# Patient Record
Sex: MALE | Race: WHITE | NOT HISPANIC OR LATINO
[De-identification: names, ages, dates, MRNs, and addresses within clinical notes are randomized per-mention and may not be internally consistent; named-entity substitution may affect disease eponyms.]

---

## 2022-02-11 ENCOUNTER — APPOINTMENT (OUTPATIENT)
Dept: DISASTER EMERGENCY | Facility: CLINIC | Age: 62
End: 2022-02-11

## 2022-03-04 ENCOUNTER — APPOINTMENT (OUTPATIENT)
Dept: DISASTER EMERGENCY | Facility: CLINIC | Age: 62
End: 2022-03-04

## 2022-04-14 PROBLEM — Z00.00 ENCOUNTER FOR PREVENTIVE HEALTH EXAMINATION: Status: ACTIVE | Noted: 2022-04-14

## 2022-09-09 ENCOUNTER — APPOINTMENT (OUTPATIENT)
Dept: DISASTER EMERGENCY | Facility: CLINIC | Age: 62
End: 2022-09-09

## 2023-05-26 ENCOUNTER — APPOINTMENT (OUTPATIENT)
Dept: OTOLARYNGOLOGY | Facility: CLINIC | Age: 63
End: 2023-05-26
Payer: COMMERCIAL

## 2023-05-26 VITALS
TEMPERATURE: 97.3 F | SYSTOLIC BLOOD PRESSURE: 132 MMHG | BODY MASS INDEX: 23.49 KG/M2 | WEIGHT: 155 LBS | HEIGHT: 68 IN | HEART RATE: 66 BPM | DIASTOLIC BLOOD PRESSURE: 85 MMHG

## 2023-05-26 DIAGNOSIS — Z78.9 OTHER SPECIFIED HEALTH STATUS: ICD-10-CM

## 2023-05-26 DIAGNOSIS — L25.3 UNSPECIFIED CONTACT DERMATITIS DUE TO OTHER CHEMICAL PRODUCTS: ICD-10-CM

## 2023-05-26 DIAGNOSIS — Z80.9 FAMILY HISTORY OF MALIGNANT NEOPLASM, UNSPECIFIED: ICD-10-CM

## 2023-05-26 DIAGNOSIS — Z82.49 FAMILY HISTORY OF ISCHEMIC HEART DISEASE AND OTHER DISEASES OF THE CIRCULATORY SYSTEM: ICD-10-CM

## 2023-05-26 DIAGNOSIS — Z86.018 PERSONAL HISTORY OF OTHER BENIGN NEOPLASM: ICD-10-CM

## 2023-05-26 DIAGNOSIS — Z87.2 PERSONAL HISTORY OF DISEASES OF THE SKIN AND SUBCUTANEOUS TISSUE: ICD-10-CM

## 2023-05-26 DIAGNOSIS — D80.9 IMMUNODEFICIENCY WITH PREDOMINANTLY ANTIBODY DEFECTS, UNSPECIFIED: ICD-10-CM

## 2023-05-26 DIAGNOSIS — J34.89 OTHER SPECIFIED DISORDERS OF NOSE AND NASAL SINUSES: ICD-10-CM

## 2023-05-26 DIAGNOSIS — Z83.3 FAMILY HISTORY OF DIABETES MELLITUS: ICD-10-CM

## 2023-05-26 PROCEDURE — 99204 OFFICE O/P NEW MOD 45 MIN: CPT | Mod: 25

## 2023-05-26 PROCEDURE — 31575 DIAGNOSTIC LARYNGOSCOPY: CPT

## 2023-05-28 ENCOUNTER — TRANSCRIPTION ENCOUNTER (OUTPATIENT)
Age: 63
End: 2023-05-28

## 2023-05-29 PROBLEM — Z78.9 DOES NOT USE ILLICIT DRUGS: Status: ACTIVE | Noted: 2023-05-26

## 2023-05-29 PROBLEM — D80.9 IMMUNOGLOBULIN DEFICIENCY: Status: RESOLVED | Noted: 2023-05-29 | Resolved: 2023-05-29

## 2023-05-29 PROBLEM — J34.89 NASAL SEPTAL PERFORATION: Status: ACTIVE | Noted: 2023-05-26

## 2023-05-29 PROBLEM — Z83.3 FAMILY HISTORY OF DIABETES MELLITUS: Status: ACTIVE | Noted: 2023-05-26

## 2023-05-29 PROBLEM — L25.3 LATEX ALLERGY, CONTACT DERMATITIS: Status: RESOLVED | Noted: 2023-05-29 | Resolved: 2023-05-29

## 2023-05-29 PROBLEM — Z87.2 HISTORY OF ROSACEA: Status: RESOLVED | Noted: 2023-05-29 | Resolved: 2023-05-29

## 2023-05-29 PROBLEM — Z80.9 FAMILY HISTORY OF CANCER: Status: ACTIVE | Noted: 2023-05-29

## 2023-05-29 PROBLEM — Z82.49 FAMILY HISTORY OF HYPERTENSION: Status: ACTIVE | Noted: 2023-05-26

## 2023-05-29 RX ORDER — GLUCOSAMINE HCL/CHONDROITIN SU 500-400 MG
3 CAPSULE ORAL
Refills: 0 | Status: ACTIVE | COMMUNITY

## 2023-05-29 RX ORDER — GLUC/MSM/COLGN2/HYAL/ANTIARTH3 375-375-20
TABLET ORAL
Refills: 0 | Status: ACTIVE | COMMUNITY

## 2023-05-29 RX ORDER — IMMUNE GLOBULIN,GAMMA(IGG) 5 G
0 VIAL (EA) INTRAVENOUS
Refills: 0 | Status: ACTIVE | COMMUNITY

## 2023-05-29 RX ORDER — ROSUVASTATIN CALCIUM 5 MG/1
5 TABLET, FILM COATED ORAL
Refills: 0 | Status: ACTIVE | COMMUNITY

## 2023-05-29 RX ORDER — LAMOTRIGINE 25 MG/1
TABLET ORAL TWICE DAILY
Refills: 0 | Status: ACTIVE | COMMUNITY

## 2023-05-29 RX ORDER — METFORMIN HYDROCHLORIDE 500 MG/1
500 TABLET, COATED ORAL
Qty: 180 | Refills: 3 | Status: ACTIVE | COMMUNITY

## 2023-05-29 RX ORDER — CYANOCOBALAMIN (VITAMIN B-12) 2500 MCG
2500 TABLET, SUBLINGUAL SUBLINGUAL WEEKLY
Refills: 0 | Status: ACTIVE | COMMUNITY

## 2023-05-29 RX ORDER — TRAZODONE HYDROCHLORIDE 300 MG/1
TABLET ORAL
Refills: 0 | Status: ACTIVE | COMMUNITY

## 2023-05-29 RX ORDER — DOXYCYCLINE 40 MG/1
40 CAPSULE ORAL DAILY
Refills: 0 | Status: ACTIVE | COMMUNITY

## 2023-05-29 RX ORDER — BUPROPION HYDROCHLORIDE 300 MG/1
300 TABLET, EXTENDED RELEASE ORAL
Refills: 0 | Status: ACTIVE | COMMUNITY

## 2023-05-29 NOTE — PROCEDURE
[de-identified] : \par Indication:  throat clearing.\par -Verbal consent was obtained from patient prior to procedure.\par Flexible laryngoscopy was performed via mouth and revealed the following:\par   -- Left oropharyngeal mass resembles a tonsil, does not extend much inferiorly\par   -- Base of tongue was symmetric and not enlarged.\par   -- Vallecula was clear\par   -- Epiglottis, both aryepiglottic folds and both false vocal folds were normal\par   -- Arytenoids both with mild edema and no erythema \par   -- True vocal folds were fully mobile and without lesions. \par   -- Post cricoid area was clear.\par   -- Interarytenoid edema was absent     \par   \par The patient tolerated the procedure well.\par

## 2023-05-29 NOTE — CONSULT LETTER
[Dear  ___] : Dear  [unfilled], [( Thank you for referring [unfilled] for consultation for _____ )] : Thank you for referring [unfilled] for consultation for [unfilled] [Please see my note below.] : Please see my note below. [Consult Closing:] : Thank you very much for allowing me to participate in the care of this patient.  If you have any questions, please do not hesitate to contact me. [Sincerely,] : Sincerely, [DrAddie  ___] : Dr. EMERSON [FreeTextEntry2] : Glenn Good MD\par [address] [facility], [address]\par [place] [id number]  [FreeTextEntry3] : \par Sarah Ferrer MD \par Otolaryngology, Head and Neck Surgery \par \par

## 2023-05-29 NOTE — PHYSICAL EXAM
[] : septum deviated to the left [Midline] : trachea located in midline position [Removed] : palatine tonsils previously removed [Laryngoscopy Performed] : laryngoscopy was performed, see procedure section for findings [Normal] : no rashes [FreeTextEntry1] : No hoarseness or muffled voice. [de-identified] : Mobile nontender LNs in level 5 bilateral 1-1.5 cm; level 2 (left 2 cm, right 1 cm) [de-identified] : Posterior low septal perforation. [de-identified] : 2.5 cm ovoid, reddish mass behind left tonsillar pillar, with mucus studding resembling crypts; protrudes more medial when he opens mouth and extends tongue. [de-identified] : S/p UPPP [de-identified] : Carotid pulses 2+ bilateral.  [de-identified] : See NECK

## 2023-05-29 NOTE — REVIEW OF SYSTEMS
[Patient Intake Form Reviewed] : Patient intake form was reviewed [Hearing Loss] : hearing loss [Ear Noises] : ear noises [Feeling Tired] : feeling tired [Negative] : Heme/Lymph [As Noted in HPI] : as noted in HPI [de-identified] : jon

## 2023-05-29 NOTE — ASSESSMENT
[FreeTextEntry1] : Mr. ORTIZ is a 62 year old man who has a new left peritonsillar mass that is not painful, but he has persistent globus sensation and chronic throat clearing for some time.  He is s/p tonsillectomy as a child and s/p UPPP as an adult. Never a smoker; no alcohol use.\par I see a 2.5 cm ovoid, slightly erythema mass protruding from behind the left tonsil pillar.  The mass is studded with what appears to be mucus in crypts, suggesting cryptic tonsillitis.\par He has bilateral level 2 and level 5 soft cervical lymphadenopathy that he attributes to his CLL.  The left level 2 is the largest on palpation, 2 cm.\par Overall, I favor an inflammatory process, but neoplasm must be ruled out.\par \par He has a low posterior nasal septal perforation that is asymptomatic and likely related to prior septoplasty.\par PLAN\par - CT neck + contrast at [facility]\par - amoxicillin\par - chlorhexidine gargles\par \par Return in 2 weeks.  He may need tonsil biopsy or revision tonsillectomy.\par

## 2023-05-29 NOTE — HISTORY OF PRESENT ILLNESS
[de-identified] : Mr. ORTIZ is a 62 year old man who was referred by Dr. Good for left peritonsillar mass.\par PMH: CLL, Hx acoustic neuroma, pituitary adenoma, depression\par \par He has many years of globus sensation in his throat, no pain or difficulty swallowing.  Constant throat clearing.\par Noted on recent exam by Dr. Good to have a left peritonsillar mass.\par No hx of tobacco use.  No alcohol use.\par He is s/p tonsillectomy as a child, with UPPP as an adult.\par Yesterday, had CT chest/abdoment/pelvis to follow his CLL.  Has lymph nodes in his neck due to CLL.\par

## 2023-06-07 ENCOUNTER — APPOINTMENT (OUTPATIENT)
Dept: OTOLARYNGOLOGY | Facility: CLINIC | Age: 63
End: 2023-06-07
Payer: COMMERCIAL

## 2023-06-07 VITALS
DIASTOLIC BLOOD PRESSURE: 89 MMHG | SYSTOLIC BLOOD PRESSURE: 134 MMHG | TEMPERATURE: 96.7 F | HEIGHT: 68 IN | BODY MASS INDEX: 23.49 KG/M2 | HEART RATE: 76 BPM | WEIGHT: 155 LBS

## 2023-06-07 PROCEDURE — 99214 OFFICE O/P EST MOD 30 MIN: CPT

## 2023-06-12 VITALS
HEART RATE: 62 BPM | WEIGHT: 154.32 LBS | SYSTOLIC BLOOD PRESSURE: 133 MMHG | HEIGHT: 67 IN | OXYGEN SATURATION: 99 % | RESPIRATION RATE: 16 BRPM | DIASTOLIC BLOOD PRESSURE: 75 MMHG | TEMPERATURE: 98 F

## 2023-06-12 NOTE — ASU PATIENT PROFILE, ADULT - NSICDXPASTMEDICALHX_GEN_ALL_CORE_FT
PAST MEDICAL HISTORY:  Acoustic neuroma     Anxiety and depression     CLL (chronic lymphocytic leukemia)     DM (diabetes mellitus)     GERD (gastroesophageal reflux disease)     HTN (hypertension)     OCTAVIO (obstructive sleep apnea)      PAST MEDICAL HISTORY:  Acoustic neuroma     Anxiety and depression     CLL (chronic lymphocytic leukemia)     DM (diabetes mellitus) controled    GERD (gastroesophageal reflux disease)     HTN (hypertension)     OCTAVIO (obstructive sleep apnea)     Pituitary adenoma

## 2023-06-12 NOTE — ASU PATIENT PROFILE, ADULT - NSICDXPASTSURGICALHX_GEN_ALL_CORE_FT
PAST SURGICAL HISTORY:  H/O sinus surgery     History of cholecystectomy     History of surgery maxillofacial reconstruction     PAST SURGICAL HISTORY:  H/O gastric bypass     H/O hernia repair right, 2022    H/O sinus surgery     History of surgery maxillofacial reconstruction    History of surgery genioglossal advansemnt    S/P UPPP (uvulopalatopharyngoplasty)

## 2023-06-13 ENCOUNTER — RESULT REVIEW (OUTPATIENT)
Age: 63
End: 2023-06-13

## 2023-06-13 ENCOUNTER — OUTPATIENT (OUTPATIENT)
Dept: OUTPATIENT SERVICES | Facility: HOSPITAL | Age: 63
LOS: 1 days | Discharge: HOME | End: 2023-06-13
Payer: COMMERCIAL

## 2023-06-13 ENCOUNTER — TRANSCRIPTION ENCOUNTER (OUTPATIENT)
Age: 63
End: 2023-06-13

## 2023-06-13 ENCOUNTER — APPOINTMENT (OUTPATIENT)
Dept: OTOLARYNGOLOGY | Facility: HOSPITAL | Age: 63
End: 2023-06-13

## 2023-06-13 VITALS
RESPIRATION RATE: 18 BRPM | OXYGEN SATURATION: 98 % | TEMPERATURE: 97 F | SYSTOLIC BLOOD PRESSURE: 108 MMHG | DIASTOLIC BLOOD PRESSURE: 62 MMHG | HEART RATE: 57 BPM

## 2023-06-13 DIAGNOSIS — Z98.890 OTHER SPECIFIED POSTPROCEDURAL STATES: Chronic | ICD-10-CM

## 2023-06-13 DIAGNOSIS — Z98.84 BARIATRIC SURGERY STATUS: Chronic | ICD-10-CM

## 2023-06-13 PROCEDURE — 88331 PATH CONSLTJ SURG 1 BLK 1SPC: CPT

## 2023-06-13 PROCEDURE — 88331 PATH CONSLTJ SURG 1 BLK 1SPC: CPT | Mod: 26

## 2023-06-13 PROCEDURE — 88305 TISSUE EXAM BY PATHOLOGIST: CPT

## 2023-06-13 PROCEDURE — 88360 TUMOR IMMUNOHISTOCHEM/MANUAL: CPT | Mod: 26,59

## 2023-06-13 PROCEDURE — 88341 IMHCHEM/IMCYTCHM EA ADD ANTB: CPT | Mod: 26,59

## 2023-06-13 PROCEDURE — 31536 LARYNGOSCOPY W/BX & OP SCOPE: CPT

## 2023-06-13 PROCEDURE — 88360 TUMOR IMMUNOHISTOCHEM/MANUAL: CPT

## 2023-06-13 PROCEDURE — 31536 LARYNGOSCOPY W/BX & OP SCOPE: CPT | Mod: GC

## 2023-06-13 PROCEDURE — 88341 IMHCHEM/IMCYTCHM EA ADD ANTB: CPT

## 2023-06-13 PROCEDURE — 88342 IMHCHEM/IMCYTCHM 1ST ANTB: CPT | Mod: 26,59

## 2023-06-13 PROCEDURE — C9399: CPT

## 2023-06-13 PROCEDURE — 88189 FLOWCYTOMETRY/READ 16 & >: CPT

## 2023-06-13 PROCEDURE — 88305 TISSUE EXAM BY PATHOLOGIST: CPT | Mod: 26

## 2023-06-13 RX ORDER — METFORMIN HYDROCHLORIDE 850 MG/1
1 TABLET ORAL
Refills: 0 | DISCHARGE

## 2023-06-13 RX ORDER — LANOLIN ALCOHOL/MO/W.PET/CERES
1 CREAM (GRAM) TOPICAL
Refills: 0 | DISCHARGE

## 2023-06-13 RX ORDER — LAMOTRIGINE 25 MG/1
1 TABLET, ORALLY DISINTEGRATING ORAL
Refills: 0 | DISCHARGE

## 2023-06-13 RX ORDER — ROSUVASTATIN CALCIUM 5 MG/1
1 TABLET ORAL
Refills: 0 | DISCHARGE

## 2023-06-13 RX ORDER — AMOXICILLIN AND CLAVULANATE POTASSIUM 500; 125 MG/1; MG/1
500-125 TABLET, FILM COATED ORAL
Qty: 14 | Refills: 0 | Status: COMPLETED | COMMUNITY
Start: 2023-05-26 | End: 2023-06-02

## 2023-06-13 RX ORDER — OXYCODONE AND ACETAMINOPHEN 5; 325 MG/1; MG/1
1 TABLET ORAL EVERY 4 HOURS
Refills: 0 | Status: DISCONTINUED | OUTPATIENT
Start: 2023-06-13 | End: 2023-06-13

## 2023-06-13 RX ORDER — DIPHENHYDRAMINE HYDROCHLORIDE AND LIDOCAINE HYDROCHLORIDE AND ALUMINUM HYDROXIDE AND MAGNESIUM HYDRO
5 KIT
Qty: 1 | Refills: 1
Start: 2023-06-13 | End: 2023-06-26

## 2023-06-13 RX ORDER — MORPHINE SULFATE 50 MG/1
4 CAPSULE, EXTENDED RELEASE ORAL
Refills: 0 | Status: DISCONTINUED | OUTPATIENT
Start: 2023-06-13 | End: 2023-06-13

## 2023-06-13 RX ORDER — IMMUNE GLOBULIN,GAMMA(IGG) 5 %
0 VIAL (ML) INTRAVENOUS
Refills: 0 | DISCHARGE

## 2023-06-13 RX ORDER — CHLORHEXIDINE GLUCONATE 213 G/1000ML
15 SOLUTION TOPICAL
Qty: 1 | Refills: 0
Start: 2023-06-13 | End: 2023-06-19

## 2023-06-13 RX ORDER — ACETAMINOPHEN 500 MG
650 TABLET ORAL EVERY 6 HOURS
Refills: 0 | Status: DISCONTINUED | OUTPATIENT
Start: 2023-06-13 | End: 2023-06-13

## 2023-06-13 RX ORDER — SODIUM CHLORIDE 9 MG/ML
1000 INJECTION, SOLUTION INTRAVENOUS
Refills: 0 | Status: DISCONTINUED | OUTPATIENT
Start: 2023-06-13 | End: 2023-06-13

## 2023-06-13 RX ORDER — BENZOCAINE 10 %
1 GEL (GRAM) MUCOUS MEMBRANE
Qty: 2 | Refills: 0
Start: 2023-06-13 | End: 2023-06-19

## 2023-06-13 RX ORDER — BUPROPION HYDROCHLORIDE 150 MG/1
1 TABLET, EXTENDED RELEASE ORAL
Refills: 0 | DISCHARGE

## 2023-06-13 RX ORDER — ONDANSETRON 8 MG/1
4 TABLET, FILM COATED ORAL EVERY 6 HOURS
Refills: 0 | Status: DISCONTINUED | OUTPATIENT
Start: 2023-06-13 | End: 2023-06-13

## 2023-06-13 NOTE — BRIEF OPERATIVE NOTE - OPERATION/FINDINGS
left oropharyngeal wall with lymph-appearing tissue extending into nasopharynx; frozen showing tonsil tissue, cannot rule out lymphoproliferative disorder Left oropharyngeal wall with lymph-appearing tissue 2.5 cm extending into nasopharynx; frozen section showed tonsil tissue, cannot rule out lymphoproliferative disorder.

## 2023-06-13 NOTE — PHYSICAL EXAM
[FreeTextEntry1] : No hoarseness or muffled voice. [de-identified] : Multiple enlarged, mobile, nontender LNs in bilateral level 2, 3, 4 and 5. [Normal] : gums are normal [Midline] : trachea located in midline position [Removed] : palatine tonsils previously removed [de-identified] : 3 cm ovoid, exophytic mass on left posterior pharyngeal wall and looks like it has crypts with debris. [de-identified] : S/p UPPP [de-identified] : See NECK

## 2023-06-13 NOTE — ASU DISCHARGE PLAN (ADULT/PEDIATRIC) - CARE PROVIDER_API CALL
Sarah Ferrer  Otolaryngology  [address], [address]  [place] [address]  Phone: [phone]  Fax: [phone]  Established Patient  Follow Up Time: 1 week

## 2023-06-13 NOTE — PRE-ANESTHESIA EVALUATION ADULT - NSANTHOSAYNRD_GEN_A_CORE
No. OCTAVIO screening performed.  STOP BANG Legend: 0-2 = LOW Risk; 3-4 = INTERMEDIATE Risk; 5-8 = HIGH Risk

## 2023-06-13 NOTE — ASSESSMENT
[FreeTextEntry1] : Mr. ORITZ has left posterior pharyngeal wall mass that has not changed after a course of oral antibiotics and that is visible on CT neck, along with his enlarging bilateral cervical lymphadenopathy.\par \par I recommended microlaryngoscopy and biopsy of the oropharyngeal mass to r/o malignancy (CLL or a squamous cell carcinoma) vs inflammation.  He understands that I do not plan to excise the entire mass at time of biopsy.\par I have explained the risks of laryngoscopy and biopsy including, but not limited to, general anesthesia, bleeding, infection, persistent symptoms, injury to the teeth/lips/gums, failure to extubate, and the possible need for further procedures.\par His questions were answered, and he agreed to proceed.\par The biopsy has been scheduled for June 13, 2023, at [facility].\par Medical clearance from Dr. Keenan would be greatly appreciated.

## 2023-06-13 NOTE — CONSULT LETTER
[Dear  ___] : Dear  [unfilled], [Please see my note below.] : Please see my note below. [Consult Closing:] : Thank you very much for allowing me to participate in the care of this patient.  If you have any questions, please do not hesitate to contact me. [Sincerely,] : Sincerely, [Courtesy Letter:] : I had the pleasure of seeing your patient, [unfilled], in my office today. [FreeTextEntry2] : Cristobal Keenan MD\par [address]\[place], [address] [FreeTextEntry3] : \par Sarah Ferrer MD \par Otolaryngology, Head and Neck Surgery \par \par  [DrAddie ___] : Dr. EMERSON

## 2023-06-13 NOTE — ASU DISCHARGE PLAN (ADULT/PEDIATRIC) - NS MD DC FALL RISK RISK
For information on Fall & Injury Prevention, visit: [email or web address] OR  [email or web address] OR  https://www.cdc.gov/steadi/patient.html

## 2023-06-13 NOTE — ASU DISCHARGE PLAN (ADULT/PEDIATRIC) - ASU DC SPECIAL INSTRUCTIONSFT
take tylenol or motrin as needed for pain  use lozenges and mouthwash as prescribed as needed for pain

## 2023-06-13 NOTE — PRE-ANESTHESIA EVALUATION ADULT - NSANTHDISPORD_GEN_ALL_CORE
4 Eyes Skin Assessment     The patient is being assess for  Admission    I agree that 2 RN's have performed a thorough Head to Toe Skin Assessment on the patient. ALL assessment sites listed below have been assessed. Areas assessed by both nurses: Clifford/Candace  [x]   Head, Face, and Ears   [x]   Shoulders, Back, and Chest  [x]   Arms, Elbows, and Hands   [x]   Coccyx, Sacrum, and IschIum  [x]   Legs, Feet, and Heels        Does the Patient have Skin Breakdown?   No         Pravin Prevention initiated:  No   Wound Care Orders initiated:  No      [facility] nurse consulted for Pressure Injury (Stage 3,4, Unstageable, DTI, NWPT, and Complex wounds), New and Established Ostomies:  No      Nurse 1 eSignature: Electronically signed by Fareed Beauchamp RN on 9/10/19 at 1:43 PM    **SHARE this note so that the co-signing nurse is able to place an eSignature**    Nurse 2 eSignature: Electronically signed by Carmen Canales RN on 9/10/19 at 2:13 PM
PACU

## 2023-06-13 NOTE — HISTORY OF PRESENT ILLNESS
[de-identified] : Mr. ORTIZ reports no change in globus sensation or throat clearing after antibiotics.  No throat pain.\par He had CT neck.\par \par INITIAL VISIT 5/26/2023\par Mr. ORTIZ is a 62 year old man who was referred by Dr. Good for left peritonsillar mass.\par PMH: CLL, Hx acoustic neuroma, pituitary adenoma, depression\par He has many years of globus sensation in his throat, no pain or difficulty swallowing.  Constant throat clearing.\par Noted on recent exam by Dr. Good to have a left peritonsillar mass.\par No hx of tobacco use.  No alcohol use.\par He is s/p tonsillectomy as a child, with UPPP as an adult.\par Yesterday, had CT chest/abdoment/pelvis to follow his CLL.  Has lymph nodes in his neck due to CLL.\par  \par \par CT NECK with intravenous contrast (6/2/2023) at [facility]:\par - COMPARISON: CT scan 5/13/2017. \par * The small visualized portion of the brain is unremarkable.  No bony skull base lesion is demonstrated. \par *  Again demonstrated are plates and screws related to previous bilateral  maxillary and mandibular surgery. Anterior retention cyst in the right  maxillary sinus has resolved since the previous study. There is now mucosal  thickening in the posterior aspect of the right maxillary sinus. \par * Mastoid air cells are clear. \par * Consistent with history, there is asymmetric soft tissue density extending  from the left side of the oropharynx to the left side of the nasopharynx.  Asymmetry extends down to the level of the superior aspect of the epiglottis.  Etiology of the soft tissue mass is not determined. \par *  Bilateral level 1A, 1B, 2A, 2B, 3, 4 and 5 lymph nodes have enlarged since  2017 consistent with history of CLL. Supraclavicular and subpectoral lymph  nodes are demonstrated. Largest lymph nodes are bilateral level 2A lymph \par  nodes. On image 255 series 303, left level 2A lymph node measures 1.9 cm x 1.2 cm. On image 256 series 303, right level 2A lymph node measures 1.5 cm x 1.0 cm. \par *  Parotid and submandibular glands appear within limits of normal. \par * No laryngeal or subglottic lesion is demonstrated. The thyroid gland is  unremarkable. \par *  Again demonstrated are degenerative changes of the cervical spine. 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 There are are  ossified disc bulges again demonstrated that appear large enough to result in \par  spinal cord compression most prominent on the left at C6-C7. These findings  are not significantly changed, but if there are clinical symptoms related to  the cervical spine that require further imaging evaluation, then I recommend \par follow-up MRI. \par  IMPRESSION: \par 1.) Interval development of asymmetric soft tissue mass centered on the left oropharynx. Soft tissue mass extends up into the left side of the nasopharynx and down to the superior aspect of the epiglottis. Etiology of the  soft tissue mass is not determined. It may be related to lymphoid tissue in  view of history of CLL. Consistent with are CLL are multiple enlarged bilateral lymph nodes. Lymph nodes are largest at bilateral level 2A. \par 2.) Again demonstrated are extensive degenerative changes of the cervical spine  including ossification of the posterior longitudinal ligament. Ossified disc bulges are large enough to result in cord compression. If there are clinical  symptoms related to the cervical spine that require further imaging evaluation, then I recommend follow-up MRI. \par (Images were reviewed.) \par

## 2023-06-23 ENCOUNTER — APPOINTMENT (OUTPATIENT)
Dept: OTOLARYNGOLOGY | Facility: CLINIC | Age: 63
End: 2023-06-23
Payer: COMMERCIAL

## 2023-06-23 VITALS
TEMPERATURE: 97.1 F | BODY MASS INDEX: 24.46 KG/M2 | HEART RATE: 73 BPM | DIASTOLIC BLOOD PRESSURE: 92 MMHG | HEIGHT: 68 IN | SYSTOLIC BLOOD PRESSURE: 146 MMHG | WEIGHT: 161.4 LBS

## 2023-06-23 DIAGNOSIS — R09.89 OTHER SPECIFIED SYMPTOMS AND SIGNS INVOLVING THE CIRCULATORY AND RESPIRATORY SYSTEMS: ICD-10-CM

## 2023-06-23 DIAGNOSIS — C91.10 CHRONIC LYMPHOCYTIC LEUKEMIA OF B-CELL TYPE NOT HAVING ACHIEVED REMISSION: ICD-10-CM

## 2023-06-23 DIAGNOSIS — J39.2 OTHER DISEASES OF PHARYNX: ICD-10-CM

## 2023-06-23 DIAGNOSIS — R59.0 LOCALIZED ENLARGED LYMPH NODES: ICD-10-CM

## 2023-06-23 LAB — SURGICAL PATHOLOGY STUDY: SIGNIFICANT CHANGE UP

## 2023-06-23 PROCEDURE — 99213 OFFICE O/P EST LOW 20 MIN: CPT

## 2023-06-23 RX ORDER — CHLORHEXIDINE GLUCONATE, 0.12% ORAL RINSE 1.2 MG/ML
0.12 SOLUTION DENTAL
Qty: 1 | Refills: 2 | Status: DISCONTINUED | COMMUNITY
Start: 2023-05-26 | End: 2023-06-23

## 2023-06-23 NOTE — CONSULT LETTER
[Dear  ___] : Dear  [unfilled], [Courtesy Letter:] : I had the pleasure of seeing your patient, [unfilled], in my office today. [Please see my note below.] : Please see my note below. [Consult Closing:] : Thank you very much for allowing me to participate in the care of this patient.  If you have any questions, please do not hesitate to contact me. [Sincerely,] : Sincerely, [FreeTextEntry2] : Cristobal Keenan MD\par [address]\[place], [address] [FreeTextEntry3] : \par Sarah Ferrer MD \par Otolaryngology, Head and Neck Surgery \par \par  [DrAddie ___] : Dr. EMERSON

## 2023-06-23 NOTE — ASSESSMENT
[FreeTextEntry1] : Mr. Farrell is s/p microlaryngoscopy and biopsy of the oropharyngeal mass on 6/13/2023.\par The verbal report re pathology is CLL.\par I will email the pathology report to him once it has been issued.

## 2023-06-23 NOTE — HISTORY OF PRESENT ILLNESS
[de-identified] : Mr. Farrell is s/p microlaryngoscopy and biopsy of the oropharyngeal mass on 6/13/2023 at [facility].\par \par Today, he reports some postnasal drip. No throat pain or difficulty swallowing.\par He saw his oncologist at [facility] earlier this week, and pathology report was requested.\par He said that "flash radiation" was mentioned as a possible treatment.\par \par \par PATHOLOGY Left posterior pharyngeal biopsy (6/13/2023)\par - pending final report \par - CLL, per verbal report from pathologist today.  Additional molecular studies pending.\par

## 2023-06-23 NOTE — PHYSICAL EXAM
[Removed] : palatine tonsils previously removed [FreeTextEntry1] : No hoarseness  [Normal] : parotid gland, submandibular gland and thyroid glands are normal [de-identified] : Multiple enlarged, mobile, nontender LNs in bilateral level 2, 3, 4 and 5. [de-identified] : Soft palate erythema above the uvula, s/p UPPP.  Left posterior pharyngeal wall lesion unchanged except medial edge biopsies. [de-identified] : see NECK.

## 2023-08-11 ENCOUNTER — NON-APPOINTMENT (OUTPATIENT)
Age: 63
End: 2023-08-11

## (undated) DEVICE — WARMING BLANKET UPPER ADULT

## (undated) DEVICE — GLV 6.5 DERMAPRENE ULTRA

## (undated) DEVICE — Device

## (undated) DEVICE — MARKING PEN W RULER

## (undated) DEVICE — DRSG TELFA 3 X 8

## (undated) DEVICE — SOL ANTI FOG

## (undated) DEVICE — VENODYNE/SCD SLEEVE CALF MEDIUM